# Patient Record
(demographics unavailable — no encounter records)

---

## 2024-10-07 NOTE — DEVELOPMENTAL MILESTONES
[Normal Development] : Normal Development [None] : none [Uses basic gestures] : uses basic gestures [Sits well without support] : sits well without support [Transitions between sitting and lying] : transitions between sitting and lying [Crawls] : crawls [Picks up small objects with 3 fingers] : picks up small objects with 3 fingers and thumb [Releases objects intentionally] : releases objects intentionally [Says "Michael" or "Mama"] : does not say "Michael" or "Mama" nonspecifically [FreeTextEntry1] : making constant sounds, repeating tones or sounds made by others

## 2024-10-07 NOTE — DISCUSSION/SUMMARY
[Normal Growth] : growth [Normal Development] : development [None] : No known medical problems [No Elimination Concerns] : elimination [No Feeding Concerns] : feeding [No Skin Concerns] : skin [Normal Sleep Pattern] : sleep [Family Adaptation] : family adaptation [Infant [place]] : infant independence [Feeding Routine] : feeding routine [Safety] : safety [No Medications] : ~He/She~ is not on any medications [Parent/Guardian] : parent/guardian [] : The components of the vaccine(s) to be administered today are listed in the plan of care. The disease(s) for which the vaccine(s) are intended to prevent and the risks have been discussed with the caretaker.  The risks are also included in the appropriate vaccination information statements which have been provided to the patient's caregiver.  The caregiver has given consent to vaccinate. 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 - RTC in a month for FLu#2, Hb/Lead - RTC for 12mo wellness

## 2024-10-07 NOTE — PHYSICAL EXAM
[Alert] : alert [Normocephalic] : normocephalic [Flat Open Anterior [place]] : flat open anterior fontanelle [Red Reflex] : red reflex bilateral [PERRL] : PERRL [Normally Placed Ears] : normally placed ears [Auricles Well Formed] : auricles well formed [Clear Tympanic membranes] : clear tympanic membranes [Light reflex present] : light reflex present [Bony landmarks visible] : bony landmarks visible [Nares Patent] : nares patent [Palate Intact] : palate intact [Uvula Midline] : uvula midline [Supple, full passive range of motion] : supple, full passive range of motion [Symmetric Chest Rise] : symmetric chest rise [Clear to Auscultation Bilaterally] : clear to auscultation bilaterally [Regular Rate and Rhythm] : regular rate and rhythm [S1, S2 present] : S1, S2 present [+2 Femoral Pulses] : (+) 2 femoral pulses [Soft] : soft [Bowel Sounds] : bowel sounds present [Central Urethral Opening] : central urethral opening [Testicles Descended] : testicles descended bilaterally [No Abnormal Lymph Nodes Palpated] : no abnormal lymph nodes palpated [Symmetric Abduction and Rotation of hips] : symmetric abduction and rotation of hips [Straight] : straight [Cranial Nerves Grossly Intact] : cranial nerves grossly intact [Tooth Eruption] : tooth eruption [Acute Distress] : no acute distress [Excessive Tearing] : no excessive tearing [Discharge] : no discharge [Palpable Masses] : no palpable masses [Murmurs] : no murmurs [Tender] : nontender [Distended] : nondistended [Hepatomegaly] : no hepatomegaly [Splenomegaly] : no splenomegaly [Allis Sign] : negative Allis sign [Spinal Dimple] : no spinal dimple [Tuft of Hair] : no tuft of hair [de-identified] : 4 teeth, a blue-gray oval lesion on the upper front gum, no active bleeding, no signs of tenderness or discomfort when palpated [de-identified] : well healed left thoracotomy incision scar, 55mm,

## 2024-10-07 NOTE — HISTORY OF PRESENT ILLNESS
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 Baldwin last month, next follow up in March 2025 (as per father, Dr. Baldwin is leaving [place] soon) - reported by father that he has heavy/barking cough at times when gets cold # daily routine: diet: takes formula and solid food x2; 'eats everything' vegetables, beef, fish, cereal, 4 bottles per day, 28oz in total  dental: 4 teeth, brushes teeth once a day sleep: 1930-7am +2naps  eye straight always? yes : 5 days per week

## 2024-10-07 NOTE — HISTORY OF PRESENT ILLNESS
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 Baldwin last month, next follow up in March 2025 (as per father, Dr. Baldwin is leaving [place] soon) - reported by father that he has heavy/barking cough at times when gets cold # daily routine: diet: takes formula and solid food x2; 'eats everything' vegetables, beef, fish, cereal, 4 bottles per day, 28oz in total  dental: 4 teeth, brushes teeth once a day sleep: 1930-7am +2naps  eye straight always? yes : 5 days per week

## 2024-10-07 NOTE — PHYSICAL EXAM
[Alert] : alert [Normocephalic] : normocephalic [Flat Open Anterior [place]] : flat open anterior fontanelle [Red Reflex] : red reflex bilateral [PERRL] : PERRL [Normally Placed Ears] : normally placed ears [Auricles Well Formed] : auricles well formed [Clear Tympanic membranes] : clear tympanic membranes [Light reflex present] : light reflex present [Bony landmarks visible] : bony landmarks visible [Nares Patent] : nares patent [Palate Intact] : palate intact [Uvula Midline] : uvula midline [Supple, full passive range of motion] : supple, full passive range of motion [Symmetric Chest Rise] : symmetric chest rise [Clear to Auscultation Bilaterally] : clear to auscultation bilaterally [Regular Rate and Rhythm] : regular rate and rhythm [S1, S2 present] : S1, S2 present [+2 Femoral Pulses] : (+) 2 femoral pulses [Soft] : soft [Bowel Sounds] : bowel sounds present [Central Urethral Opening] : central urethral opening [Testicles Descended] : testicles descended bilaterally [No Abnormal Lymph Nodes Palpated] : no abnormal lymph nodes palpated [Symmetric Abduction and Rotation of hips] : symmetric abduction and rotation of hips [Straight] : straight [Cranial Nerves Grossly Intact] : cranial nerves grossly intact [Tooth Eruption] : tooth eruption [Acute Distress] : no acute distress [Excessive Tearing] : no excessive tearing [Discharge] : no discharge [Palpable Masses] : no palpable masses [Murmurs] : no murmurs [Tender] : nontender [Distended] : nondistended [Hepatomegaly] : no hepatomegaly [Splenomegaly] : no splenomegaly [Allis Sign] : negative Allis sign [Spinal Dimple] : no spinal dimple [Tuft of Hair] : no tuft of hair [de-identified] : 4 teeth, a blue-gray oval lesion on the upper front gum, no active bleeding, no signs of tenderness or discomfort when palpated [de-identified] : well healed left thoracotomy incision scar, 55mm,

## 2024-10-07 NOTE — DISCUSSION/SUMMARY
[Normal Growth] : growth [Normal Development] : development [None] : No known medical problems [No Elimination Concerns] : elimination [No Feeding Concerns] : feeding [No Skin Concerns] : skin [Normal Sleep Pattern] : sleep [Family Adaptation] : family adaptation [Infant [place]] : infant independence [Feeding Routine] : feeding routine [Safety] : safety [No Medications] : ~He/She~ is not on any medications [Parent/Guardian] : parent/guardian [] : The components of the vaccine(s) to be administered today are listed in the plan of care. The disease(s) for which the vaccine(s) are intended to prevent and the risks have been discussed with the caretaker.  The risks are also included in the appropriate vaccination information statements which have been provided to the patient's caregiver.  The caregiver has given consent to vaccinate. 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 - RTC in a month for FLu#2, Hb/Lead - RTC for 12mo wellness

## 2024-10-07 NOTE — HISTORY OF PRESENT ILLNESS
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 Baldwin last month, next follow up in March 2025 (as per father, Dr. Baldwin is leaving [place] soon) - reported by father that he has heavy/barking cough at times when gets cold # daily routine: diet: takes formula and solid food x2; 'eats everything' vegetables, beef, fish, cereal, 4 bottles per day, 28oz in total  dental: 4 teeth, brushes teeth once a day sleep: 1930-7am +2naps  eye straight always? yes : 5 days per week

## 2024-10-07 NOTE — DISCUSSION/SUMMARY
[Normal Growth] : growth [Normal Development] : development [None] : No known medical problems [No Elimination Concerns] : elimination [No Feeding Concerns] : feeding [No Skin Concerns] : skin [Normal Sleep Pattern] : sleep [Family Adaptation] : family adaptation [Infant [place]] : infant independence [Feeding Routine] : feeding routine [Safety] : safety [No Medications] : ~He/She~ is not on any medications [Parent/Guardian] : parent/guardian [] : The components of the vaccine(s) to be administered today are listed in the plan of care. The disease(s) for which the vaccine(s) are intended to prevent and the risks have been discussed with the caretaker.  The risks are also included in the appropriate vaccination information statements which have been provided to the patient's caregiver.  The caregiver has given consent to vaccinate. 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 - RTC in a month for FLu#2, Hb/Lead - RTC for 12mo wellness

## 2024-10-07 NOTE — PHYSICAL EXAM
[Alert] : alert [Normocephalic] : normocephalic [Flat Open Anterior [place]] : flat open anterior fontanelle [Red Reflex] : red reflex bilateral [PERRL] : PERRL [Normally Placed Ears] : normally placed ears [Auricles Well Formed] : auricles well formed [Clear Tympanic membranes] : clear tympanic membranes [Light reflex present] : light reflex present [Bony landmarks visible] : bony landmarks visible [Nares Patent] : nares patent [Palate Intact] : palate intact [Uvula Midline] : uvula midline [Supple, full passive range of motion] : supple, full passive range of motion [Symmetric Chest Rise] : symmetric chest rise [Clear to Auscultation Bilaterally] : clear to auscultation bilaterally [Regular Rate and Rhythm] : regular rate and rhythm [S1, S2 present] : S1, S2 present [+2 Femoral Pulses] : (+) 2 femoral pulses [Soft] : soft [Bowel Sounds] : bowel sounds present [Central Urethral Opening] : central urethral opening [Testicles Descended] : testicles descended bilaterally [No Abnormal Lymph Nodes Palpated] : no abnormal lymph nodes palpated [Symmetric Abduction and Rotation of hips] : symmetric abduction and rotation of hips [Straight] : straight [Cranial Nerves Grossly Intact] : cranial nerves grossly intact [Tooth Eruption] : tooth eruption [Acute Distress] : no acute distress [Excessive Tearing] : no excessive tearing [Discharge] : no discharge [Palpable Masses] : no palpable masses [Murmurs] : no murmurs [Tender] : nontender [Distended] : nondistended [Hepatomegaly] : no hepatomegaly [Splenomegaly] : no splenomegaly [Allis Sign] : negative Allis sign [Spinal Dimple] : no spinal dimple [Tuft of Hair] : no tuft of hair [de-identified] : 4 teeth, a blue-gray oval lesion on the upper front gum, no active bleeding, no signs of tenderness or discomfort when palpated [de-identified] : well healed left thoracotomy incision scar, 55mm,

## 2024-11-06 NOTE — HISTORY OF PRESENT ILLNESS
[de-identified] : blood test questions, hb/lead [FreeTextEntry6] : - As per father, Donn's paternal grandfather suggested MTHFR genetic testing for him, because there are few cases of related mutation in paternal family, and Donn's mother has recessive trait. - His father has his friend who is , so father wants to do testing only in [facility] and he will bring the results to the  for further interpretation

## 2024-11-06 NOTE — HISTORY OF PRESENT ILLNESS
[de-identified] : blood test questions, hb/lead [FreeTextEntry6] : - As per father, Donn's paternal grandfather suggested MTHFR genetic testing for him, because there are few cases of related mutation in paternal family, and Donn's mother has recessive trait. - His father has his friend who is , so father wants to do testing only in [facility] and he will bring the results to the  for further interpretation

## 2024-11-06 NOTE — DISCUSSION/SUMMARY
[FreeTextEntry1] : # genetic testing questions, # family history of MTHFR mutation - Recommended to consult  as genetic testing and its interpretation may be complicated and need to be comprehensive. May require counseling and consent forms to sign. - If parents would like to consult the  of grandfather's friend and prefer to do testing under [facility], that  should issue a prescription of specific test(s) and forward it to us. - will send  information in [facility]

## 2024-11-06 NOTE — DISCUSSION/SUMMARY
[FreeTextEntry1] : # genetic testing questions, # family history of MTHFR mutation - Recommended to consult  as genetic testing and its interpretation may be complicated and need to be comprehensive. May require counseling and consent forms to sign. - If parents would like to consult the  of grandfather's friend and prefer to do testing under [facility], that  should issue a prescription of specific test(s) and forward it to us. - will send  information in [facility]

## 2025-01-08 NOTE — PHYSICAL EXAM
[Alert] : alert [Normocephalic] : normocephalic [Closed Anterior [place]] : closed anterior fontanelle [Red Reflex] : red reflex bilateral [PERRL] : PERRL [Normally Placed Ears] : normally placed ears [Auricles Well Formed] : auricles well formed [Clear Tympanic membranes] : clear tympanic membranes [Light reflex present] : light reflex present [Bony landmarks visible] : bony landmarks visible [Nares Patent] : nares patent [Palate Intact] : palate intact [Uvula Midline] : uvula midline [Tooth Eruption] : tooth eruption [Supple, full passive range of motion] : supple, full passive range of motion [Symmetric Chest Rise] : symmetric chest rise [Clear to Auscultation Bilaterally] : clear to auscultation bilaterally [Regular Rate and Rhythm] : regular rate and rhythm [S1, S2 present] : S1, S2 present [+2 Femoral Pulses] : (+) 2 femoral pulses [Soft] : soft [Bowel Sounds] : normoactive bowel sounds [Central Urethral Opening] : central urethral opening [Testicles Descended] : testicles descended bilaterally [No Abnormal Lymph Nodes Palpated] : no abnormal lymph nodes palpated [Symmetric Abduction and Rotation of Hips] : symmetric abduction and rotation of hips [Straight] : straight [Cranial Nerves Grossly Intact] : cranial nerves grossly intact [Circumcised] : circumcised [Discharge] : no discharge [Palpable Masses] : no palpable masses [Murmurs] : no murmurs [Tender] : nontender [Distended] : nondistended [Hepatomegaly] : no hepatomegaly [Splenomegaly] : no splenomegaly [Allis Sign] : negative Allis sign [Rash or Lesions] : no rash/lesions [de-identified] : adhesion from 2 to 10 o'clock of penile circumference [de-identified] : well healed left thoracotomy incision scar, 55mm

## 2025-01-08 NOTE — DISCUSSION/SUMMARY
[Normal Growth] : growth [Normal Development] : development [None] : No known medical problems [No Elimination Concerns] : elimination [No Feeding Concerns] : feeding [No Skin Concerns] : skin [Normal Sleep Pattern] : sleep [Family Support] : family support [Establishing Routines] : establishing routines [Feeding and Appetite Changes] : feeding and appetite changes [Establishing A Dental Home] : establishing a dental home [Safety] : safety [No Medications] : ~He/She~ is not on any medications [Parent/Guardian] : parent/guardian [] : The components of the vaccine(s) to be administered today are listed in the plan of care. The disease(s) for which the vaccine(s) are intended to prevent and the risks have been discussed with the caretaker.  The risks are also included in the appropriate vaccination information statements which have been provided to the patient's caregiver.  The caregiver has given consent to vaccinate. [FreeTextEntry1] : # penile adhesion - betamethasone application twice a day for 6-8 weeks - once separation occurs, then switch to vaseline application  - RTC in 6 weeks for follow up # cardiac anomaly - f/u with cardiologist # Wellness - growth chart reviewed - [employer] parent handout given: antipyretic dosage, VIS, dental care, transition from formula to cow milk, 3 meals and 2 snacks, daily milk consumption less than 24 oz per day, graduation from bottles, etc. - RTC for 15mo wellness == MMR [id number] 2026/5/29

## 2025-01-08 NOTE — DISCUSSION/SUMMARY
[Normal Growth] : growth [Normal Development] : development [None] : No known medical problems [No Elimination Concerns] : elimination [No Feeding Concerns] : feeding [No Skin Concerns] : skin [Normal Sleep Pattern] : sleep [Family Support] : family support [Establishing Routines] : establishing routines [Feeding and Appetite Changes] : feeding and appetite changes [Establishing A Dental Home] : establishing a dental home [Safety] : safety [No Medications] : ~He/She~ is not on any medications [Parent/Guardian] : parent/guardian [] : The components of the vaccine(s) to be administered today are listed in the plan of care. The disease(s) for which the vaccine(s) are intended to prevent and the risks have been discussed with the caretaker.  The risks are also included in the appropriate vaccination information statements which have been provided to the patient's caregiver.  The caregiver has given consent to vaccinate. [FreeTextEntry1] : # penile adhesion - betamethasone application twice a day for 6-8 weeks - once separation occurs, then switch to vaseline application  - RTC in 6 weeks for follow up # cardiac anomaly - f/u with cardiologist # Wellness - growth chart reviewed - [employer] parent handout given: antipyretic dosage, VIS, dental care, transition from formula to cow milk, 3 meals and 2 snacks, daily milk consumption less than 24 oz per day, graduation from bottles, etc. - RTC for 15mo wellness == MMR [id number] 2026/5/29

## 2025-01-08 NOTE — DEVELOPMENTAL MILESTONES
[Normal Development] : Normal Development [None] : none [Imitates new gestures] : imitates new gestures [Says "Dad" or "Mom" with meaning] : says "Dad" or "Mom" with meaning [Uses one word other than Mom or] : uses one word other than Mom or Dad or personal names [Follows a verbal command that] : follows a verbal command that includes a gesture [Drops object in a cup] : drops object in a cup [Picks up small object with 2 finger] : picks up small object with 2 finger pincer grasp [Picks up food and eats it] : picks up food and eats it [Stands without support] : does not stand without support [FreeTextEntry1] : # Development:  cruises  pulls to stand does not yet stand alone walks alone a few steps with his hand held fills and empties containers  holds cup and drinks pincer grasp  turns pages  imitates words verbal skills: 1-2 words develops stranger anxiety

## 2025-01-08 NOTE — HISTORY OF PRESENT ILLNESS
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 Baldwin 4 months prior, next follow up in March 2025 - reported by father that he has barking cough at times when gets cold, but getting milder # daily routine: diet: takes formula and solid food x3; 'eats everything' vegetables, beef, fish, cereal, formula 25-30oz in total  dental: 8+ teeth, brushes teeth once a day no constipation sleep: 1930-7am +2naps  eye straight always? yes : 5 days per week, likes singing - questions: circumcision site

## 2025-01-08 NOTE — PHYSICAL EXAM
[Alert] : alert [Normocephalic] : normocephalic [Closed Anterior [place]] : closed anterior fontanelle [Red Reflex] : red reflex bilateral [PERRL] : PERRL [Normally Placed Ears] : normally placed ears [Auricles Well Formed] : auricles well formed [Clear Tympanic membranes] : clear tympanic membranes [Light reflex present] : light reflex present [Bony landmarks visible] : bony landmarks visible [Nares Patent] : nares patent [Palate Intact] : palate intact [Uvula Midline] : uvula midline [Tooth Eruption] : tooth eruption [Supple, full passive range of motion] : supple, full passive range of motion [Symmetric Chest Rise] : symmetric chest rise [Clear to Auscultation Bilaterally] : clear to auscultation bilaterally [Regular Rate and Rhythm] : regular rate and rhythm [S1, S2 present] : S1, S2 present [+2 Femoral Pulses] : (+) 2 femoral pulses [Soft] : soft [Bowel Sounds] : normoactive bowel sounds [Central Urethral Opening] : central urethral opening [Testicles Descended] : testicles descended bilaterally [No Abnormal Lymph Nodes Palpated] : no abnormal lymph nodes palpated [Symmetric Abduction and Rotation of Hips] : symmetric abduction and rotation of hips [Straight] : straight [Cranial Nerves Grossly Intact] : cranial nerves grossly intact [Circumcised] : circumcised [Discharge] : no discharge [Palpable Masses] : no palpable masses [Murmurs] : no murmurs [Tender] : nontender [Distended] : nondistended [Hepatomegaly] : no hepatomegaly [Splenomegaly] : no splenomegaly [Allis Sign] : negative Allis sign [Rash or Lesions] : no rash/lesions [de-identified] : adhesion from 2 to 10 o'clock of penile circumference [de-identified] : well healed left thoracotomy incision scar, 55mm

## 2025-04-09 NOTE — PHYSICAL EXAM
[Alert] : alert [No Acute Distress] : no acute distress [Normocephalic] : normocephalic [Anterior [place] Closed] : anterior fontanelle closed [No Excess Tearing] : no excess tearing [Symmetric Light Reflex] : symmetric light reflex [PERRL] : PERRL [EOMI Bilateral] : EOMI bilateral [Normally Placed Ears] : normally placed ears [Auricles Well Formed] : auricles well formed [Clear Tympanic membranes with present light reflex and bony landmarks] : clear tympanic membranes with present light reflex and bony landmarks [No Discharge] : no discharge [Nares Patent] : nares patent [Palate Intact] : palate intact [Uvula Midline] : uvula midline [Tooth Eruption] : tooth eruption  [Supple, full passive range of motion] : supple, full passive range of motion [No Palpable Masses] : no palpable masses [Symmetric Chest Rise] : symmetric chest rise [Clear to Auscultation Bilaterally] : clear to auscultation bilaterally [Regular Rate and Rhythm] : regular rate and rhythm [S1, S2 present] : S1, S2 present [No Murmurs] : no murmurs [Soft] : soft [NonTender] : non tender [Non Distended] : non distended [Normoactive Bowel Sounds] : normoactive bowel sounds [No Hepatomegaly] : no hepatomegaly [No Splenomegaly] : no splenomegaly [Circumcised] : circumcised [Central Urethral Opening] : central urethral opening [Testicles Descended Bilaterally] : testicles descended bilaterally [Patent] : patent [Normally Placed] : normally placed [No Abnormal Lymph Nodes Palpated] : no abnormal lymph nodes palpated [No Clavicular Crepitus] : no clavicular crepitus [Negative Rapp-Ortalani] : negative Rapp-Ortalani [Symmetric Buttocks Creases] : symmetric buttocks creases [No Spinal Dimple] : no spinal dimple [NoTuft of Hair] : no tuft of hair [Cranial Nerves Grossly Intact] : cranial nerves grossly intact [No Rash or Lesions] : no rash or lesions

## 2025-04-09 NOTE — PHYSICAL EXAM
[Alert] : alert [No Acute Distress] : no acute distress [Normocephalic] : normocephalic [Anterior [place] Closed] : anterior fontanelle closed [No Excess Tearing] : no excess tearing [Symmetric Light Reflex] : symmetric light reflex [PERRL] : PERRL [EOMI Bilateral] : EOMI bilateral [Normally Placed Ears] : normally placed ears [Auricles Well Formed] : auricles well formed [Clear Tympanic membranes with present light reflex and bony landmarks] : clear tympanic membranes with present light reflex and bony landmarks [No Discharge] : no discharge [Nares Patent] : nares patent [Palate Intact] : palate intact [Uvula Midline] : uvula midline [Tooth Eruption] : tooth eruption  [Supple, full passive range of motion] : supple, full passive range of motion [No Palpable Masses] : no palpable masses [Symmetric Chest Rise] : symmetric chest rise [Clear to Auscultation Bilaterally] : clear to auscultation bilaterally [Regular Rate and Rhythm] : regular rate and rhythm [S1, S2 present] : S1, S2 present [No Murmurs] : no murmurs [Soft] : soft [NonTender] : non tender [Non Distended] : non distended [Normoactive Bowel Sounds] : normoactive bowel sounds [No Hepatomegaly] : no hepatomegaly [No Splenomegaly] : no splenomegaly [Circumcised] : circumcised [Central Urethral Opening] : central urethral opening [Testicles Descended Bilaterally] : testicles descended bilaterally [Patent] : patent [Normally Placed] : normally placed [No Abnormal Lymph Nodes Palpated] : no abnormal lymph nodes palpated [No Clavicular Crepitus] : no clavicular crepitus [Negative Rapp-Ortalani] : negative Rapp-Ortalani [Symmetric Buttocks Creases] : symmetric buttocks creases [No Spinal Dimple] : no spinal dimple [NoTuft of Hair] : no tuft of hair [Cranial Nerves Grossly Intact] : cranial nerves grossly intact [No Rash or Lesions] : no rash or lesions

## 2025-04-09 NOTE — DEVELOPMENTAL MILESTONES
[Normal Development] : Normal Development [None] : none [FreeTextEntry1] : # Development 15mo:  climbs furniture, rides toys dances linnea and recovers  throws ball stacks two object tower drinks from cup, uses spoon pincer grasp jargon trying to whistle verbal skills: people's name only, cammy mama and brother's name develops stranger anxiety

## 2025-04-09 NOTE — HISTORY OF PRESENT ILLNESS
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 yes : 5 days per week, likes singing dancing,  # penile adhesion, s/p betamethasone application  - foreskin  well

## 2025-04-09 NOTE — DISCUSSION/SUMMARY
[Normal Growth] : growth [Normal Development] : development [None] : No known medical problems [No Elimination Concerns] : elimination [No Feeding Concerns] : feeding [No Skin Concerns] : skin [Normal Sleep Pattern] : sleep [Communication and Social Development] : communication and social development [Sleep Routines and Issues] : sleep routines and issues [Temper Tantrums and Discipline] : temper tantrums and discipline [Healthy Teeth] : healthy teeth [Safety] : safety [No Medications] : ~He/She~ is not on any medications [Parent/Guardian] : parent/guardian [] : The components of the vaccine(s) to be administered today are listed in the plan of care. The disease(s) for which the vaccine(s) are intended to prevent and the risks have been discussed with the caretaker.  The risks are also included in the appropriate vaccination information statements which have been provided to the patient's caregiver.  The caregiver has given consent to vaccinate. [FreeTextEntry1] : # penile adhesion - healing well - c/w vaseline application # vascular ring - encouraged to follow with cardiologist # Wellness - growth chart reviewed - [employer] parent handout given: antipyretic dosage, vaccines catch up, milk consumption less than 24 oz per day, dental appointment, speech development, etc. - RTC for 18mo wellness

## 2025-07-07 NOTE — DEVELOPMENTAL MILESTONES
[Normal Development] : Normal Development [None] : none [FreeTextEntry1] : # Development:  runs rides toys uses spoon & fork pincer grasp names 1 to 2 pictures verbal skills: at least 10 words follows simple directions   starts having his/her own will  imitates household chores  points to 2 or more body parts

## 2025-07-07 NOTE — DISCUSSION/SUMMARY
[Normal Growth] : growth [Normal Development] : development [None] : No known medical problems [No Elimination Concerns] : elimination [No Feeding Concerns] : feeding [No Skin Concerns] : skin [Normal Sleep Pattern] : sleep [Family Support] : family support [Child Development and Behavior] : child development and behavior [Language Promotion/Hearing] : language promotion/hearing [[place] Training Readiness] : toliet training readiness [Safety] : safety [No Medications] : ~He/She~ is not on any medications [Parent/Guardian] : parent/guardian [] : The components of the vaccine(s) to be administered today are listed in the plan of care. The disease(s) for which the vaccine(s) are intended to prevent and the risks have been discussed with the caretaker.  The risks are also included in the appropriate vaccination information statements which have been provided to the patient's caregiver.  The caregiver has given consent to vaccinate. 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  - RTC for 21mo wellness; MCHAT, lead/Hb, Hep A #2, Flu vaccine

## 2025-07-07 NOTE — HISTORY OF PRESENT ILLNESS
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, s/p division of the ligamentum arteriosum and pexy of the left subclavian artery, since March 2024 - last seen by cardiologist in May 2025, no restriction in physical activity, no need of prophylactic antibiotic, follows in 1 year # interested in MTHFR genetic testing  - genetics information given, father states they may consult # penile adhesion, s/p betamethasone application  - foreskin  well # daily routine: diet: eats vegetables, balanced diet, solid food x3, milk 30 oz in total per day dental: brushes teeth once a day, saw dentist recently, all ok no constipation sleep: 1930-7am + 1-2 naps  eye straight always? yes : 5 days per week, likes singing dancing, drawing coloring

## 2025-07-07 NOTE — PHYSICAL EXAM
[Alert] : alert [No Acute Distress] : no acute distress [Playful] : playful [Normocephalic] : normocephalic [Anterior [employer] Closed] : anterior fontanelle closed [Red Reflex Bilateral] : red reflex bilateral [PERRL] : PERRL [Normally Placed Ears] : normally placed ears [Auricles Well Formed] : auricles well formed [Clear Tympanic membranes with present light reflex and bony landmarks] : clear tympanic membranes with present light reflex and bony landmarks [No Discharge] : no discharge [Nares Patent] : nares patent [Palate Intact] : palate intact [Uvula Midline] : uvula midline [Tooth Eruption] : tooth eruption  [Supple, full passive range of motion] : supple, full passive range of motion [No Palpable Masses] : no palpable masses [Symmetric Chest Rise] : symmetric chest rise [Clear to Auscultation Bilaterally] : clear to auscultation bilaterally [Regular Rate and Rhythm] : regular rate and rhythm [S1, S2 present] : S1, S2 present [No Murmurs] : no murmurs [Soft] : soft [NonTender] : non tender [Non Distended] : non distended [Normoactive Bowel Sounds] : normoactive bowel sounds [No Hepatomegaly] : no hepatomegaly [No Splenomegaly] : no splenomegaly [Central Urethral Opening] : central urethral opening [Testicles Descended Bilaterally] : testicles descended bilaterally [Patent] : patent [Normally Placed] : normally placed [No Abnormal Lymph Nodes Palpated] : no abnormal lymph nodes palpated [No Clavicular Crepitus] : no clavicular crepitus [Symmetric Buttocks Creases] : symmetric buttocks creases [No Spinal Dimple] : no spinal dimple [NoTuft of Hair] : no tuft of hair [Straight] : straight [Cranial Nerves Grossly Intact] : cranial nerves grossly intact [Circumcised] : circumcised [de-identified] : well healed surgical incision scar on the left axilla